# Patient Record
(demographics unavailable — no encounter records)

---

## 2025-07-08 NOTE — ASSESSMENT
[FreeTextEntry1] : ABBE RESTREPO is a 69-year y/o M with history and physical exam consistent with right calf strain. Due to patient's swelling and calf tenderness, discussed there is potential for a blood clot. There is also potential for a right knee Baker's cyst.   Patient's right hip pain is consistent with a hip adductor strain. No physical exam findings of intra-articular pathology of the right hip, despite DJD on XR imaging.   - Obtaining an US of the RLE to evaluate for a DVT.    - Recommend physical therapy to regain range of motion, strengthening and symptomatic improvement. Prescription given in office today.   -  After discussion of options, Patient was provided with a prescription for Meloxicam 15mg. Instructed patient not to take until results of his doppler - if negative for DVT, then patient may initiate the meloxicam. Instructed patient to d/c other NSAIDs while taking meloxicam. Time was taken to discuss the importance of proper prescription drug management. They were instructed to take this daily with food for two weeks and then intermittently as needed. The patient was also warned of potential risks/side effects of the medication. These potential risks include bruising, gastrointestinal bleed, gastrointestinal burning, allergic reaction and reduced blood clotting. The patient expressed verbal understanding. I recommend that the patient follow-up with their medical physician to discuss any significant specific potential issues with long term use such as interactions with current medications or with exacerbation of underlying medical morbidities. - Recommend rest, ice, compression, elevation - Recommend activity modification, avoid strenuous or high impact activities   Patient was educated on their diagnosis today. Emphasized the importance of gentle stretching and strengthening. Patient expressed understanding and all questions were answered today.   Follow up in 2 weeks to monitor progress. Can consider an MRI at that time based off symptoms.

## 2025-07-08 NOTE — HISTORY OF PRESENT ILLNESS
[de-identified] : 07/08/2025 HPI: ABBE RESTREPO is a 69 year y/o M who presents for Right groin and R calf pain. Patient injuries his R calf playing pickleball around fathers day, where he said he was unable to keep playing. More recently, he injured his R hip over the past 3 day - ago likely pickleball related. Patient notes the ecchymosis just presented recently.   Patient not on a blood thinner.   Patient presents today, 69 year M, for evaluation of their RT calf and RT groin pain Date of Injury/Onset: 3 weeks ago for RT calf, 2-3 days for groin Mechanism of injury: Patient was playing pickleball and felt sharp pain in the right calf This is not a Work-Related Injury being treated under Worker's Compensation. This is not an athletic injury occurring associated with an interscholastic or organized sports team.   Pain: At Rest: 2 /10 With Activity: 7 /10 Quality of symptoms: pain in the RT groin and pain in the RT calf    Affecting Sleep: Sometimes Stiffness upon waking, lasting greater than 30mins: No Difficulty with stairs: Yes Difficulty getting in and out of car: Yes Difficulty applying shoe: Yes, sometimes Sit to stand stiffness: Yes, sometimes Affects walking short/long distances? Yes Home/Work/Recreation affected? Yes   Improves with:  rest Worse with:  activity Have you been treated for this in the past? [facility] today  Have you had surgery for this in the past? none   OTC Medicines: Advil RX medicines:  none Heat, Ice, Elevation: ice and elevation   CSI or Gel Injections: None Physical Therapy/ HEP: None   Previous Treatment/Imaging/Studies Since Last Visit: none

## 2025-07-08 NOTE — IMAGING
[de-identified] :  RIGHT hip is examined.   Inspection no gross deformity, skin intact, no erythema Palpation; patient with ++ tenderness to palpation in the groin, consistent with the hip adductors, - tenderness over the greater troches ROM: 110 flexion, 20 IR, 60 ER, 55 Abduction, 25 Adduction - Bharati - Log roll - FADIR, - TOSHA - SLR 5/5 motor to lower extremity Sensation intact to light touch throughout all lower extremity dermatomes No numbness in lateral femoral cutaneous nerve 2+ distal pulses  RIGHT Knee examined.   Patient is in no acute distress, alert and oriented   Inspection: Skin is intact - Effusion ++ Ecchymosis posterior aspect of right knee Atrophy is not present Antalgic gait is not present   Palpation: - Medial joint line tenderness - Lateral joint line tenderness - Patellar tenderness ++ Medial gastroc TTP  - Pes anserine bursa - Popliteal fullness - Achilles tendon TTP    Calf soft and nontender   Motion: Knee extension is 0 Knee flexion is 120   Strength: Quadriceps strength is 5/5 Hamstring strength is 5/5   Special Tests: Lachman is normal Posterior drawer is normal Varus stress stability is normal Valgus stress stability is normal ROY TEST NEGATIVE    - Medial Lucila test - Lateral Lucila test Patellofemoral grind test is normal Patellar apprehension test is normal   NV exam grossly intact distally. Sensation is grossly intact to light touch in the foot Motor function is 5/5 in the foot Capillary refill is less than 2 seconds in the toes Lymphadenopathy is not present Peripheral edema is not present   07/08/2025 AP, Lateral of the right tib/fib. No fractures noted. Knee joint and ankle joint well maintained.  AP Pelvis, AP Hip and Frog lateral of RIGHT hip. Joint line narrowing, subchondral sclerosis, CAM lesion and osteophyte formation inferior and superior margin consistent with moderate DJD. No fractures or dislocation noted.

## 2025-07-22 NOTE — HISTORY OF PRESENT ILLNESS
[de-identified] : 07/22/2025: ABBE RESTREPO is a 69 year y/o M who presents for a f/u evaluation of right groin and right calf pain. Patient was prescribed PT, Meloxicam, and US RLE at his last visit. Patient has had good improvement with Meloxicam, states is taking it once daily currently. Patient did not go to PT. Patient had US RLE done at  on 7/8/25. Patient feeling much better and states little to no pain today. Patient's right groin pain has completely resolved but he does note continued ongoing calf pain.  Patient is not on a blood thinner.   07/08/2025 HPI: ABBE RESTREPO is a 69 year y/o M who presents for Right groin and R calf pain. Patient injuries his R calf playing pickleball around fathers day, where he said he was unable to keep playing. More recently, he injured his R hip over the past 3 day - ago likely pickleball related. Patient notes the ecchymosis just presented recently.   Patient not on a blood thinner.   Patient presents today, 69 year M, for evaluation of their RT calf and RT groin pain Date of Injury/Onset: 3 weeks ago for RT calf, 2-3 days for groin Mechanism of injury: Patient was playing pickleball and felt sharp pain in the right calf This is not a Work-Related Injury being treated under Worker's Compensation. This is not an athletic injury occurring associated with an interscholastic or organized sports team.   Pain: At Rest: 2 /10 With Activity: 7 /10 Quality of symptoms: pain in the RT groin and pain in the RT calf    Affecting Sleep: Sometimes Stiffness upon waking, lasting greater than 30mins: No Difficulty with stairs: Yes Difficulty getting in and out of car: Yes Difficulty applying shoe: Yes, sometimes Sit to stand stiffness: Yes, sometimes Affects walking short/long distances? Yes Home/Work/Recreation affected? Yes   Improves with:  rest Worse with:  activity Have you been treated for this in the past? [facility] today  Have you had surgery for this in the past? none   OTC Medicines: Advil RX medicines:  none Heat, Ice, Elevation: ice and elevation   CSI or Gel Injections: None Physical Therapy/ HEP: None   Previous Treatment/Imaging/Studies Since Last Visit: none

## 2025-07-22 NOTE — ASSESSMENT
[FreeTextEntry1] : ABBE RESTREPO is a 69-year y/o M with history and physical exam consistent with right calf strain, which is improving. US negative for DVT but displays evidence of collection consistent with hematoma. Ecchymosis in the posterior aspect of the right knee has resolved.   Patient's right hip pain is consistent with a hip adductor strain - resolved. No physical exam findings of intra-articular pathology of the right hip, despite DJD on XR imaging.    - Recommend physical therapy to regain range of motion, strengthening and symptomatic improvement. Prescription given in office today.   -  After discussion of options, Patient was provided with another rx for Meloxicam 15mg prn for pain. Instructed patient not to take until results of his doppler - if negative for DVT, then patient may initiate the meloxicam. Instructed patient to d/c other NSAIDs while taking meloxicam. Time was taken to discuss the importance of proper prescription drug management. They were instructed to take this daily with food for two weeks and then intermittently as needed. The patient was also warned of potential risks/side effects of the medication. These potential risks include bruising, gastrointestinal bleed, gastrointestinal burning, allergic reaction and reduced blood clotting. The patient expressed verbal understanding. I recommend that the patient follow-up with their medical physician to discuss any significant specific potential issues with long term use such as interactions with current medications or with exacerbation of underlying medical morbidities.  - Recommend rest, ice/heat - Recommend activity modification, avoid strenuous or high impact activities - advised against pivoting, running or pivoting activities. recommended patient avoid pickleball or sports for at least 6 weeks after his injury    Patient was educated on their diagnosis today. Emphasized the importance of gentle stretching and strengthening. Patient expressed understanding and all questions were answered today.   Patient to follow up as needed. Patient was instructed to call for re-evaluation if symptoms worsen or change.

## 2025-07-22 NOTE — IMAGING
[de-identified] : RIGHT hip is examined.   Inspection no gross deformity, skin intact, no erythema Palpation; patient with - tenderness to palpation in the groin, consistent with the hip adductors has resolved, - tenderness over the greater troches ROM: 110 flexion, 20 IR, 60 ER, 55 Abduction, 25 Adduction - Bharati - Log roll - FADIR, - TOSHA - SLR 5/5 motor to lower extremity Sensation intact to light touch throughout all lower extremity dermatomes No numbness in lateral femoral cutaneous nerve 2+ distal pulses  RIGHT Knee examined.   Patient is in no acute distress, alert and oriented   Inspection: Skin is intact - Effusion Ecchymosis posterior aspect of right knee has resolved  Atrophy is not present Antalgic gait is not present   Palpation: - Medial joint line tenderness - Lateral joint line tenderness - Patellar tenderness + Medial gastroc TTP, improved  - Pes anserine bursa - Popliteal fullness - Achilles tendon TTP    Calf soft and nontender   Motion: Knee extension is 0 Knee flexion is 120   Strength: Quadriceps strength is 5/5 Hamstring strength is 5/5   Special Tests: Lachman is normal Posterior drawer is normal Varus stress stability is normal Valgus stress stability is normal ROY TEST NEGATIVE    - Medial Lucila test - Lateral Lucila test Patellofemoral grind test is normal Patellar apprehension test is normal   NV exam grossly intact distally. Sensation is grossly intact to light touch in the foot Motor function is 5/5 in the foot Capillary refill is less than 2 seconds in the toes Lymphadenopathy is not present Peripheral edema is not present   US RLE done at  on 7/8/25. IMPRESSION: *No evidence of deep venous thrombosis. *Fluid collection in the right medial calf possibly representing a hematoma, soft tissue tear versus inflammatory focus.  07/08/2025 AP, Lateral of the right tib/fib. No fractures noted. Knee joint and ankle joint well maintained.  AP Pelvis, AP Hip and Frog lateral of RIGHT hip. Joint line narrowing, subchondral sclerosis, CAM lesion and osteophyte formation inferior and superior margin consistent with moderate DJD. No fractures or dislocation noted.